# Patient Record
Sex: FEMALE | Race: WHITE | NOT HISPANIC OR LATINO | URBAN - METROPOLITAN AREA
[De-identification: names, ages, dates, MRNs, and addresses within clinical notes are randomized per-mention and may not be internally consistent; named-entity substitution may affect disease eponyms.]

---

## 2024-01-20 ENCOUNTER — APPOINTMENT (RX ONLY)
Dept: URBAN - METROPOLITAN AREA CLINIC 102 | Facility: CLINIC | Age: 49
Setting detail: DERMATOLOGY
End: 2024-01-20

## 2024-01-20 DIAGNOSIS — L40.0 PSORIASIS VULGARIS: ICD-10-CM

## 2024-01-20 PROCEDURE — ? COUNSELING: TOPICAL STEROIDS

## 2024-01-20 PROCEDURE — 99203 OFFICE O/P NEW LOW 30 MIN: CPT

## 2024-01-20 PROCEDURE — ? COUNSELING

## 2024-01-20 PROCEDURE — ? PRESCRIPTION SAMPLES PROVIDED

## 2024-01-20 PROCEDURE — ? PRESCRIPTION

## 2024-01-20 RX ORDER — TAPINAROF 10 MG/1000MG
CREAM TOPICAL
Qty: 60 | Refills: 2 | Status: ERX | COMMUNITY
Start: 2024-01-20

## 2024-01-20 RX ORDER — CLOBETASOL PROPIONATE 0.5 MG/G
CREAM TOPICAL BID
Qty: 60 | Refills: 3 | Status: ERX | COMMUNITY
Start: 2024-01-20

## 2024-01-20 RX ADMIN — CLOBETASOL PROPIONATE: 0.5 CREAM TOPICAL at 00:00

## 2024-01-20 RX ADMIN — TAPINAROF: 10 CREAM TOPICAL at 00:00

## 2024-01-20 ASSESSMENT — PGA PSORIASIS: PGA PSORIASIS 2020: MILD

## 2024-01-20 NOTE — HPI: PSORIASIS (PATIENT REPORTED)
Do You Have A Family History Of Psoriasis?: yes
Have You Been Diagnosed With Psoriatic Arthritis?: no
Where Is Your Psoriasis Located?: hands and bilateral knees in past, present to left knee
List Prescription Topical Steroids You Tried (Separate Each Name With A Comma):: unknown topical regimen
Additional History: Past flare 1989

## 2024-01-20 NOTE — PROCEDURE: MIPS QUALITY
Quality 130: Documentation Of Current Medications In The Medical Record: Current Medications Documented
Quality 431: Preventive Care And Screening: Unhealthy Alcohol Use - Screening: Patient not identified as an unhealthy alcohol user when screened for unhealthy alcohol use using a systematic screening method
Detail Level: Detailed
Quality 47: Advance Care Plan: Advance Care Planning discussed and documented; advance care plan or surrogate decision maker documented in the medical record.
Quality 226: Preventive Care And Screening: Tobacco Use: Screening And Cessation Intervention: Patient screened for tobacco use, is a smoker AND received Cessation Counseling within measurement period or in the six months prior to the measurement period

## 2024-01-20 NOTE — PROCEDURE: PRESCRIPTION SAMPLES PROVIDED
Expiration Date (Optional): 03/2024
Samples Given: Vtama
Detail Level: Zone
Lot/Batch Number (Optional): [id number]

## 2024-03-23 ENCOUNTER — APPOINTMENT (RX ONLY)
Dept: URBAN - METROPOLITAN AREA CLINIC 102 | Facility: CLINIC | Age: 49
Setting detail: DERMATOLOGY
End: 2024-03-23

## 2024-03-23 DIAGNOSIS — L40.0 PSORIASIS VULGARIS: ICD-10-CM | Status: IMPROVED

## 2024-03-23 PROCEDURE — ? PRESCRIPTION MEDICATION MANAGEMENT

## 2024-03-23 PROCEDURE — ? PRESCRIPTION

## 2024-03-23 PROCEDURE — ? COUNSELING

## 2024-03-23 PROCEDURE — 99214 OFFICE O/P EST MOD 30 MIN: CPT

## 2024-03-23 RX ORDER — TAPINAROF 10 MG/1000MG
CREAM TOPICAL
Qty: 60 | Refills: 6 | Status: ERX

## 2024-03-23 ASSESSMENT — LOCATION DETAILED DESCRIPTION DERM: LOCATION DETAILED: LEFT KNEE

## 2024-03-23 ASSESSMENT — BSA PSORIASIS: % BODY COVERED IN PSORIASIS: 3

## 2024-03-23 ASSESSMENT — LOCATION ZONE DERM: LOCATION ZONE: LEG

## 2024-03-23 ASSESSMENT — ITCH NUMERIC RATING SCALE: HOW SEVERE IS YOUR ITCHING?: 2

## 2024-03-23 ASSESSMENT — PGA PSORIASIS: PGA PSORIASIS 2020: MILD

## 2024-03-23 ASSESSMENT — LOCATION SIMPLE DESCRIPTION DERM: LOCATION SIMPLE: LEFT KNEE

## 2024-03-23 NOTE — PROCEDURE: PRESCRIPTION MEDICATION MANAGEMENT
Render In Strict Bullet Format?: No
Samples Given: Vtama, zoryve
Detail Level: Zone
Continue Regimen: Clobetasol prn flares